# Patient Record
Sex: FEMALE | Race: WHITE | Employment: FULL TIME | URBAN - METROPOLITAN AREA
[De-identification: names, ages, dates, MRNs, and addresses within clinical notes are randomized per-mention and may not be internally consistent; named-entity substitution may affect disease eponyms.]

---

## 2021-11-19 ENCOUNTER — APPOINTMENT (OUTPATIENT)
Dept: GENERAL RADIOLOGY | Age: 21
End: 2021-11-19
Payer: COMMERCIAL

## 2021-11-19 ENCOUNTER — HOSPITAL ENCOUNTER (EMERGENCY)
Age: 21
Discharge: HOME | End: 2021-11-19
Attending: EMERGENCY MEDICINE
Payer: COMMERCIAL

## 2021-11-19 VITALS
RESPIRATION RATE: 12 BRPM | OXYGEN SATURATION: 100 % | TEMPERATURE: 98 F | SYSTOLIC BLOOD PRESSURE: 137 MMHG | HEART RATE: 72 BPM | BODY MASS INDEX: 28.89 KG/M2 | WEIGHT: 157 LBS | DIASTOLIC BLOOD PRESSURE: 88 MMHG | HEIGHT: 62 IN

## 2021-11-19 DIAGNOSIS — S93.402A MODERATE LEFT ANKLE SPRAIN, INITIAL ENCOUNTER: Primary | ICD-10-CM

## 2021-11-19 PROCEDURE — 73630 X-RAY EXAM OF FOOT: CPT

## 2021-11-19 PROCEDURE — 73610 X-RAY EXAM OF ANKLE: CPT

## 2021-11-19 PROCEDURE — 99283 EMERGENCY DEPT VISIT LOW MDM: CPT

## 2021-11-19 ASSESSMENT — PAIN SCALES - GENERAL: PAINLEVEL_OUTOF10: 3

## 2021-11-19 ASSESSMENT — PAIN DESCRIPTION - FREQUENCY: FREQUENCY: INTERMITTENT

## 2021-11-19 ASSESSMENT — PAIN DESCRIPTION - DESCRIPTORS: DESCRIPTORS: STABBING

## 2021-11-19 ASSESSMENT — PAIN DESCRIPTION - PAIN TYPE: TYPE: ACUTE PAIN

## 2021-11-19 ASSESSMENT — PAIN DESCRIPTION - ORIENTATION: ORIENTATION: LEFT

## 2021-11-19 ASSESSMENT — PAIN DESCRIPTION - LOCATION: LOCATION: ANKLE

## 2021-11-20 NOTE — ED PROVIDER NOTES
[facility] EMERGENCY DEPT VISIT      Patient Identification  Brian Hampton is a 24 y.o. female. Chief Complaint   Ankle Pain (fell down hill c/o left ft/ankle pain denies other injury or LOC)      History of Present Illness: This is a  24 y.o. female who presents ambulatory  to the ED with complaints of left ankle pain since falling down a hill yesterday afternoon. Pain posterior ankle. Able to ambulate if she does not plantar or dorsiflex foot while walking. Taking motrin and icing ankle. History reviewed. No pertinent past medical history. History reviewed. No pertinent surgical history. No current facility-administered medications for this encounter. No current outpatient medications on file. No Known Allergies    Social History     Socioeconomic History    Marital status: Single     Spouse name: Not on file    Number of children: Not on file    Years of education: Not on file    Highest education level: Not on file   Occupational History    Not on file   Tobacco Use    Smoking status: Never Smoker    Smokeless tobacco: Never Used   Substance and Sexual Activity    Alcohol use: Yes     Comment: occ    Drug use: Not on file    Sexual activity: Not on file   Other Topics Concern    Not on file   Social History Narrative    Not on file     Social Determinants of Health     Financial Resource Strain:     Difficulty of Paying Living Expenses: Not on file   Food Insecurity:     Worried About Running Out of Food in the Last Year: Not on file    Sheila of Food in the Last Year: Not on file   Transportation Needs:     Lack of Transportation (Medical): Not on file    Lack of Transportation (Non-Medical):  Not on file   Physical Activity:     Days of Exercise per Week: Not on file    Minutes of Exercise per Session: Not on file   Stress:     Feeling of Stress : Not on file   Social Connections:     Frequency of Communication with Friends and Family: Not on file    Frequency of intact in the upper and lower extremities. DERMATOLOGIC: No petechiae, rashes, or ecchymoses. ED COURSE AND MEDICAL DECISION MAKING:      Radiology:  All plain films have been evaluated by myself. They may have been overread by radiologist as noted in chart. Other radiologic studies (i.e. CT, MRI, ultrasounds, etc ) have been interpreted by radiologist.     XR ANKLE LEFT (MIN 3 VIEWS)   Final Result   No acute osseous findings in the ankle or foot. Soft tissue swelling over adjacent to lateral malleolus. XR FOOT LEFT (MIN 3 VIEWS)   Final Result   No acute osseous findings in the ankle or foot. Soft tissue swelling over adjacent to lateral malleolus. Labs:  No results found for this visit on 11/19/21. Treatment in the department:  Patient received no meds while in the ED. Orthotic boot provided    Medical decision making:    I estimate there is LOW risk for FRACTURE, DISLOCATION, COMPARTMENT SYNDROME, DEEP VENOUS THROMBOSIS, SEPTIC ARTHRITIS, OSTEOMYELITIS, TENDON OR NEUROVASCULAR INJURY, thus I consider the discharge disposition reasonable. Horacio Leal and I have discussed the diagnosis and risks, and we agree with discharging home to follow-up with their primary doctor or the referral orthopedist. We also discussed returning to the Emergency Department immediately if new or worsening symptoms occur. Clinical Impression:  1. Moderate left ankle sprain, initial encounter        Dispo:  Patient will be discharged  at this time. Patient was informed of this decision and agrees with plan. I have discussed lab and xray findings with patient and they understand. Questions were answered to the best of my ability. Discharge vitals:  Blood pressure 137/88, pulse 72, temperature 98 °F (36.7 °C), temperature source Oral, resp. rate 12, height 5' 2\" (1.575 m), weight 157 lb (71.2 kg), SpO2 100 %.     Prescriptions given:   New Prescriptions    No medications on file         This chart was created using dragon voice recognition software.         Kelsey Ayala MD  11/19/21 2551

## 2021-11-20 NOTE — ED NOTES
Pt dc/d with instructions in stable condition, ambulatory to lobby. Home per ride.       Maggie Lopez RN  11/19/21 2055